# Patient Record
Sex: MALE | Race: WHITE | NOT HISPANIC OR LATINO | Employment: FULL TIME | URBAN - NONMETROPOLITAN AREA
[De-identification: names, ages, dates, MRNs, and addresses within clinical notes are randomized per-mention and may not be internally consistent; named-entity substitution may affect disease eponyms.]

---

## 2021-05-13 ENCOUNTER — TRANSCRIBE ORDERS (OUTPATIENT)
Dept: ADMINISTRATIVE | Facility: HOSPITAL | Age: 56
End: 2021-05-13

## 2021-05-13 DIAGNOSIS — E78.2 MIXED HYPERLIPIDEMIA: Primary | ICD-10-CM

## 2021-05-13 DIAGNOSIS — E66.01 MORBID OBESITY (HCC): ICD-10-CM

## 2021-05-13 DIAGNOSIS — E11.69 TYPE 2 DIABETES MELLITUS WITH OTHER SPECIFIED COMPLICATION, UNSPECIFIED WHETHER LONG TERM INSULIN USE (HCC): ICD-10-CM

## 2021-06-18 ENCOUNTER — HOSPITAL ENCOUNTER (OUTPATIENT)
Dept: GENERAL RADIOLOGY | Facility: HOSPITAL | Age: 56
Discharge: HOME | End: 2021-06-18

## 2021-06-18 ENCOUNTER — HOSPITAL ENCOUNTER (OUTPATIENT)
Dept: CARDIOLOGY | Facility: HOSPITAL | Age: 56
Discharge: HOME | End: 2021-06-18

## 2021-06-18 ENCOUNTER — TRANSCRIBE ORDERS (OUTPATIENT)
Dept: GENERAL RADIOLOGY | Facility: HOSPITAL | Age: 56
End: 2021-06-18

## 2021-06-18 ENCOUNTER — HOSPITAL ENCOUNTER (OUTPATIENT)
Dept: NUTRITION | Facility: HOSPITAL | Age: 56
Discharge: HOME | End: 2021-06-18

## 2021-06-18 VITALS — HEIGHT: 66 IN | WEIGHT: 315 LBS | BODY MASS INDEX: 50.62 KG/M2

## 2021-06-18 DIAGNOSIS — Z72.0 TOBACCO ABUSE: ICD-10-CM

## 2021-06-18 DIAGNOSIS — M25.561 ACUTE BILATERAL KNEE PAIN: ICD-10-CM

## 2021-06-18 DIAGNOSIS — G47.33 OSA (OBSTRUCTIVE SLEEP APNEA): ICD-10-CM

## 2021-06-18 DIAGNOSIS — G62.9 NEUROPATHY: ICD-10-CM

## 2021-06-18 DIAGNOSIS — E66.2: ICD-10-CM

## 2021-06-18 DIAGNOSIS — R06.09 DOE (DYSPNEA ON EXERTION): ICD-10-CM

## 2021-06-18 DIAGNOSIS — E11.9 TYPE 2 DIABETES MELLITUS WITHOUT COMPLICATION, UNSPECIFIED WHETHER LONG TERM INSULIN USE (HCC): ICD-10-CM

## 2021-06-18 DIAGNOSIS — R60.9 EDEMA, UNSPECIFIED TYPE: ICD-10-CM

## 2021-06-18 DIAGNOSIS — J44.9 CHRONIC OBSTRUCTIVE PULMONARY DISEASE, UNSPECIFIED COPD TYPE (HCC): ICD-10-CM

## 2021-06-18 DIAGNOSIS — R06.02 SHORTNESS OF BREATH: ICD-10-CM

## 2021-06-18 DIAGNOSIS — R06.02 SHORTNESS OF BREATH: Primary | ICD-10-CM

## 2021-06-18 DIAGNOSIS — M25.562 ACUTE BILATERAL KNEE PAIN: ICD-10-CM

## 2021-06-18 PROCEDURE — 73562 X-RAY EXAM OF KNEE 3: CPT

## 2021-06-18 PROCEDURE — 71046 X-RAY EXAM CHEST 2 VIEWS: CPT

## 2021-06-18 PROCEDURE — 93005 ELECTROCARDIOGRAM TRACING: CPT | Performed by: NURSE PRACTITIONER

## 2021-06-18 NOTE — PROGRESS NOTES
Adult Outpatient Nutrition  Assessment/PES    Patient Name:  Flaco Patel  YOB: 1965  MRN: [id number]    Assessment Date:  6/18/2021    Comments:  RD spoke with pt for initial nutrition appt focusing on DM 2, hyperlipidemia, and obesity. His current weight is ~330 lbs. Due to contact limitation, unable to obtain signature. Pt given copies of forms, has acknowledged and agreed.     Pt notes that he is a  and has access to a refrigerator in his truck. He often eats sandwiches and soups throughout the day and may drink ~9 Pepsis daily. His girlfriend cooks most of his evening and weekend meals. He does enjoy a variety of foods including non-starchy vegetables and lean proteins, chicken being his favorite. Benefits if decreased soda intake and more consistent carbohydrate intake was reviewed. Carbohydrate sources and portion sizes were discussed and pt asked a variety of questions about different foods and carbohydrate content. Signs/symptoms and treatments for hyperglycemia and hypoglycemia were reviewed.     Pt does have a glucometer but has not started using it to test his blood sugar yet. Healthy blood sugar levels at fasting and 2 hours after a meal were reviewed. He plans to have his girlfriend help him set up the glucometer and will log his readings to share with RD and his primary care. He is currently not involved in any physical activity, RD to send low impact, seated exercises.    3 goals were set:  1. Start eating something small in the morning to help shift eating schedule to avoid eating in the middle of the night.  2. Eat consistent carbohydrate diet, focusing on <60 gm carbohydrate at a meal and 15 - 30 gm at a snack.  3. Cut back on regular soda substituting diet and/or water.    Pt notes that all of these goals are feasible. He denies any nutrition-related questions/concerns at this time. Pt has RD contact information and is encouraged to reach out with questions/concerns  "prior to a follow-up appt scheduled for August 6th @ 10:30 AM. RD available PRN.    General Info     Row Name 06/18/21 1359       Today's Session    Person(s) attending today's session  Patient     Services Used Today?  No       General Information    How Well Do You Speak English?  very well    Do You Speak a Language Other Than English at Home?  no    Preferred Language  English    Are you able to read and write English?  Yes        Physical Findings     Row Name 06/18/21 1359          Physical Findings    Overall Physical Appearance  obese         Anthropometrics     Row Name 06/18/21 1359          Anthropometrics    Height  167.6 cm (66\")     Weight  (!) 150 kg (330 lb)        Ideal Body Weight (IBW)    Ideal Body Weight (IBW) (kg)  65.3     % Ideal Body Weight  229.22        Body Mass Index (BMI)    BMI (kg/m2)  53.37         Nutritional Info/Activity     Row Name 06/18/21 1400       Nutritional Information    Have you had weight changes?  Yes    Describe weight changes  Gained ~20 lbs recently    Have you tried to lose weight before?  No    Do you have difficulty chewing food?  No    Functional Status  able to prepare meals;able to purchase food;ambulatory    List any food cravings/trigger foods you have  Pepsi    What is the biggest challenge you have with your diet?  Knowledge    What type of support do you currently use to help you with your health issues?  Girlfriend    Enter everything you can remember eating in the last 24 hours (1 day)  3-4 AM: [place] with cajun turkey, swiss cheese, bonilla, ham, tomato on a roll Noon: \"\". 7-8 PM: Soup       Physical Activity    Are you currently involved in an activity/exercise program?   No          Estimated/Assessed Needs     Row Name 06/18/21 1359          Calculation Measurements    Weight Used For Calculations  150 kg (330 lb)     Height  167.6 cm (66\")        Estimated/Assessed Needs    Additional Documentation  Calorie Requirements (Group);Protein " Requirements (Group);[place]-St. Jeor Equation (Group);Fluid Requirements (Group)        Calorie Requirements    Estimated Calorie Need Method  [place]-St Jeor     Estimated Calorie Requirement Comment  2210 - 1156        [place]-St. Jeor Equation    RMR ([place]-St. Jeor Equation)  2274.62        Protein Requirements    Weight Used For Protein Calculations  150 kg (330 lb)     Est Protein Requirement Amount (gms/kg)  1.0 gm protein 120 - 149 gm     Estimated Protein Requirements (gms/day)  149.69        Fluid Requirements    Fluid Requirements (mL/day)  2275     Estimated Fluid Requirement Method  other (see comments) 1 mL/kcal     RDA Method (mL)  2275                 Problem/Interventions:  Problem 1     Row Name 06/18/21 1402          Nutrition Diagnoses Problem 1    Problem 1  Impaired Nutrient Utilization     Etiology (related to)  Medical Diagnosis     Endocrine  DM2     Hematological  Other (comment) Hyperlipidemia     Signs/Symptoms (evidenced by)  Biochemical     Specific Labs Noted  Glucose;HgbA1C;Triglyceride         Problem 2     Row Name 06/18/21 1403          Nutrition Diagnoses Problem 2    Problem 2  Overweight/Obesity     Etiology (related to)  Factors Affecting Nutrition     Best Intake of  Other Soda     Signs/Symptoms (evidenced by)  BMI     BMI  Greater than 40               Intervention Goal     Row Name 06/18/21 1403          Intervention Goal    General  Meet nutritional needs for age/condition;Improved nutrition related lab(s)     PO  Meet estimated needs     Weight  Appropriate weight loss           Nutrition Prescription     Row Name 06/18/21 1404          Nutrition Prescription PO    PO Prescription  Begin/change diet     Begin/Change Diet to  Regular     Common Modifiers  Cardiac;Consistent Carbohydrate     New PO Prescription Ordered?  No, recommended         Education/Evaluation     Row Name 06/18/21 1404          Education    Education  Provided education regarding     Provided  education regarding  Diet rationale;Healthy eating for diabetes;Key food habit change;Medical diagnosis;Nutrition for age;Preventative health           Electronically signed by:  Renea Davenport RD  06/18/21 14:05 EDT

## 2021-06-20 LAB
QT INTERVAL: 378 MS
QTC INTERVAL: 413 MS

## 2021-08-27 ENCOUNTER — HOSPITAL ENCOUNTER (OUTPATIENT)
Dept: NUTRITION | Facility: HOSPITAL | Age: 56
Discharge: HOME | End: 2021-08-27

## 2021-08-27 NOTE — PROGRESS NOTES
Nutrition Services    Patient Name:  Flaco Patel  YOB: 1965  MRN: [id number]  Admit Date:  8/27/2021    RD spoke with pt for initial nutrition appt focusing on DM 2, hyperlipidemia, and obesity. He estimates his current weight to be ~325 lbs, a ~5 lb weight loss since his last appt. Due to contact limitation, unable to obtain signature. Pt given copies of forms, has acknowledged and agreed.   His previously set goals were reviewed:  1. Start eating something small in the morning to help shift eating schedule to avoid eating in the middle of the night. He has met this goal 50% of the time during the week. He has been eating oatmeal this past week but missed breakfast 2 days.   2. Eat consistent carbohydrate diet, focusing on <60 gm carbohydrate at a meal and 15 - 30 gm at a snack. He has been sticking to this meal plan structure. He has been limiting his intake of carbohydrates but still eats fruit like 1 banana daily or tangerines or prunes or a fruit cup and some grains like rice or breads. He has focused a lot on portion sizes. For example he may eat two hot dogs in one bun or eat a chicken sandwich without the bun. He has also cut back on potato intake, corn intake, and rice intake.  3. Cut back on regular soda substituting diet and/or water. He is only drinking 1 regular Pepsi per day, down from a previous ~9 daily. The rest of the day he drinks 4-5 Pepsi Max (no sugar) and 3-4 bottled garland.    He has been checking his blood sugar upon waking up and it is usually ~100. During the day it is around 110 with the highest reading he's had at 127 ~4-5 PM. He has been eating more salads and asked questions about specific foods and food preparation methods. He notes that he is really trying to be conscious about what he eats. KENDY was very supportive of his progress. He is interested in checking his weight at his next doctor's appointment and would like to follow-up after his upcoming visit.     1  goal was set:  1. Add protein to his evening snack of a fruit cup. (examples provided)    Pt notes that all of these goals are feasible. He denies any nutrition-related questions/concerns at this time. Pt has RD contact information and is encouraged to reach out with questions/concerns prior to a follow-up appt scheduled for October 4th @ 2:30 PM. RD available PRN.      Electronically signed by:  Renea Davenport RD  08/27/21 11:27 EDT

## 2021-10-04 ENCOUNTER — APPOINTMENT (OUTPATIENT)
Dept: NUTRITION | Facility: HOSPITAL | Age: 56
End: 2021-10-04

## 2021-11-01 ENCOUNTER — APPOINTMENT (OUTPATIENT)
Dept: NUTRITION | Facility: HOSPITAL | Age: 56
End: 2021-11-01

## 2021-11-17 ENCOUNTER — APPOINTMENT (OUTPATIENT)
Dept: NUTRITION | Facility: HOSPITAL | Age: 56
End: 2021-11-17

## 2021-12-10 ENCOUNTER — HOSPITAL ENCOUNTER (OUTPATIENT)
Dept: NUTRITION | Facility: HOSPITAL | Age: 56
Discharge: HOME | End: 2021-12-10

## 2021-12-10 NOTE — PROGRESS NOTES
"Adult Outpatient Nutrition  Assessment/PES    Patient Name:  Flaco Patel  YOB: 1965  MRN: [id number]    Assessment Date:  12/10/2021    Comments:      RD spoke with pt for follow up nutrition appt focusing on DM 2, hyperlipidemia, and obesity. Pt reports he has not weighed himself recently but that all his clothes are fitting looser. Due to contact limitation, unable to obtain signature. Pt given copies of forms, has acknowledged and agreed. He states that his blood glucose has averaged around  and is around 126 in the evenings. He states that he has only been eating around one meal per day d/t being busy with work. RD encouraged pt to eat every few hours, combining carbohydrate with protein to help maintain blood glucose levels. Pt reports he is on Rybelsus and has to monitor his blood glucose because it \"can go low\". RD reviewed symptoms of hypoglycemia and explained that if BG < 70 to consume 15 g CHO (provided examples) and recheck in 15 minutes. Pt reports that he has completely stopped drinking regular soda (was drinking one daily, down from 9/day) and will have an occasional diet Pepsi. He states he has been drinking a lot of water throughout the day. RD was very supportive of his progress.    His previously set goal was reviewed:    1. Add protein to his evening snack of a fruit cup. He has met this goal. Pt reports his often will now eat fresh fruit such as a banana or sliced apples. RD reviewed examples of protein to add to fresh fruit for a snack.         2 goals were set:    1. Continue to monitor blood glucose levels.   2. Continue to combine protein and carbohydrate for meals and snacks.     Pt notes that all of these goals are feasible. He denies any nutrition-related questions/concerns at this time. Pt has RD contact information and is encouraged to reach out with questions/concerns prior to a follow-up appt scheduled for January 14th @ 2:30 PM. RD available " PRN.                        Problem/Interventions:                      Electronically signed by:  Michelle Lugo RD  12/10/21 09:07 EST

## 2022-01-18 ENCOUNTER — HOSPITAL ENCOUNTER (OUTPATIENT)
Dept: NUTRITION | Facility: HOSPITAL | Age: 57
Discharge: HOME | End: 2022-01-18

## 2022-01-18 NOTE — PROGRESS NOTES
Adult Outpatient Nutrition  Assessment    Patient Name:  Flaco Patel  YOB: 1965  MRN: [id number]    Assessment Date:  1/18/2022    Comments:      RD spoke w/ pt via Telehealth for nutrition counseling regarding obesity, DM2 and hyperlipidemia. Pt reports                       Electronically signed by:  Regi Victor RD  01/18/22 16:47 EST

## 2022-03-03 ENCOUNTER — HOSPITAL ENCOUNTER (OUTPATIENT)
Dept: NUTRITION | Facility: HOSPITAL | Age: 57
Discharge: HOME | End: 2022-03-03

## 2022-03-03 NOTE — PROGRESS NOTES
Adult Outpatient Nutrition  Assessment/PES    Patient Name:  Flaco Patel  YOB: 1965  MRN: [id number]    Assessment Date:  3/3/2022    Comments:      RD spoke with pt over the phone for nutrition follow-up appt focusing on nutrition counseling regarding obesity, DM2 and hyperlipidemia. Pt given copies of forms, has acknowledged and agreed. Pt states that he has been losing weight and estimates approx 20 lb weight loss. He reports his clothes are fitting looser than they previously were and he has been feeling better. He has eliminated regular soda from his diet and will sometimes have a diet soda. Pt reports sometimes going 8-9 hrs without eating d/t busy schedule and not always being hungry. He states he will have a nighttime snack consisting of fruit. He states that his BG around noon has been 96-98. RD reviewed protocols for hypo-and hyperglycemia. RD also encouraged pt to add protein to all meals and snacks including his nighttime snack to stabilize BG. RD provided examples of protein foods for snacks. Pt has been drinking 1/2-1 Ensure for snack occassionally. RD encouraged pt to drink 1/2 if he is having it as a snack and to switch to Glucerna or Boost Glucose Control to help stabilize BG.     His previously set goal was reviewed:    1. Eat breakfast 3 x weekly. Pt has met this goal. Pt reports not always being hungry in the morning but is trying to have something for breakfast. He reports eating one piece of whole wheat toast with melissa, ham and egg.     Pt asked a variety of questions which were addressed. RD to mail pt snack and meal ideas, CHO counting, portion sizes, and MyPlate method.     2 goals were set:    1. Have protein, such as a handful of nuts, with nighttime snack.   2. Have a snack midday with protein and CHO-RD provided examples.         Pt notes that all of these goals are feasible. She denies any nutrition-related questions/concerns at this time. Pt has RD contact  information and is encouraged to reach out with questions/concerns prior to a follow-up appt scheduled for May 4 @ 2:30.  RD available PRN                      Problem/Interventions:                      Electronically signed by:  Michelle Lugo RD  03/03/22 15:41 EST

## 2022-05-04 ENCOUNTER — HOSPITAL ENCOUNTER (OUTPATIENT)
Dept: NUTRITION | Facility: HOSPITAL | Age: 57
Discharge: HOME | End: 2022-05-04
Admitting: NURSE PRACTITIONER

## 2022-05-04 VITALS — BODY MASS INDEX: 50.62 KG/M2 | HEIGHT: 66 IN | WEIGHT: 315 LBS

## 2022-05-04 PROCEDURE — 97803 MED NUTRITION INDIV SUBSEQ: CPT

## 2022-05-04 NOTE — PROGRESS NOTES
Adult Outpatient Nutrition  Assessment/PES    Patient Name:  Flaco Patel  YOB: 1965  MRN: [id number]    Assessment Date:  5/4/2022    Comments:    RD spoke to pt over the phone for Telehealth appointment regarding weight loss. Pt reports he has now lost a total of 30#. But hasn't weighed himself in two weeks but believes he is now down to 320#. Pt reports he overall feels much better and has gone down clothing sizes from a 4x to a 2x. Pt and his wife are both trying/losing weight and holding each other accountable. Pt reports he's eating more veggies, nuts, and fruit. Pt is planning meals and has decreased consumption of sweets, pasta, and bread. Pt states he is decreasing his portion sizes and measuring out his portions at meal times. RD congratulated pt on his progress and encouraged him to continue on his path of eating healthier. Pt reports eating fruits and veggies at snack time. RD recommended that the pt increase his protein intake at snack times. Pt reports he goes to the doctor on May 19th, and is excited to share his progress with his provider. RD and pt to discuss his doctors visit at upcoming follow-up appointment.      Two goals were set:  1. Increase protein intake at snack times.   2. Continue weight loss journey and to live a healthy life.     Pt notes all goals are feasible. He denies any nutrition-related questions/concerns at this time. Pt has RD contact information and is encouraged to reach out with questions/concerns prior to follow-up appointment scheduled for Monday, June 13th @ 10:30AM. RD available PRN.      General Info     Row Name 05/04/22 1539       Today's Session    Person(s) attending today's session Patient     Services Used Today? No       General Information    How Well Do You Speak English? very well    Preferred Language English    Are you able to read and write English? Yes    People in Home child(gerri), dependent;spouse               Physical  "Findings     Row Name 05/04/22 1539          Physical Findings    Overall Physical Appearance session via telehealth                Anthropometrics     Row Name 05/04/22 1540 05/04/22 1539       Anthropometrics    Height 167.6 cm (66\") 167.6 cm (66\")    Weight 145 kg (320 lb) 145 kg (320 lb)    Age for Calculations 56 --    Height for Calculation 1.676 m (5' 6\") --    Weight for Calculation 145 kg (320 lb) --               Retired Nutritional Info/Activity     Row Name 05/04/22 1540          Eating Environment    Eating environment Alone                Home Nutrition Report     Row Name 05/04/22 1540          Home Nutrition Report    Diet No specific                Estimated/Assessed Needs - Anthropometrics     Row Name 05/04/22 1540 05/04/22 1539       Anthropometrics    Height 167.6 cm (66\") 167.6 cm (66\")    Weight 145 kg (320 lb) 145 kg (320 lb)    Age for Calculations 56 --    Height for Calculation 1.676 m (5' 6\") --    Weight for Calculation 145 kg (320 lb) --       Estimated/Assessed Needs    Additional Documentation Fluid Requirements (Group);KCAL/KG (Group);Protein Requirements (Group);Estimated Calorie Needs (Group) --       Estimated Calorie Needs    Estimated Calorie Requirement (kcal/day) 1595-1795kcals/day --       KCAL/KG    KCAL/KG 14 Kcal/Kg (kcal);Kcal/KG (kcal) comment  11kcal/kg --    14 Kcal/Kg (kcal) 2032.114 --    KCAL/KG (kcal) 1595 --       Protein Requirements    Weight Used For Protein Calculations 145 kg (320 lb) --    Est Protein Requirement Amount (gms/kg) 0.8 gm protein --    Estimated Protein Requirements (gms/day) 116.12 --       Fluid Requirements    Fluid Requirements (mL/day) 1795  1mL/kcal --    RDA Method (mL) 1795 --                      Problem/Interventions:   Problem 1     Row Name 05/04/22 1544          Nutrition Diagnoses Problem 1    Problem 1 Overweight/Obesity     Etiology (related to) Other (comment)  diet and physical activity imbalance     Signs/Symptoms (evidenced " by) BMI     BMI Greater than 40                        Intervention Goal     Row Name 05/04/22 1544          Intervention Goal    General Maintain nutrition;Meet nutritional needs for age/condition     PO Meet estimated needs     Weight Appropriate weight loss                  Nutrition Prescription     Row Name 05/04/22 1545          Nutrition Prescription PO    Common Modifiers Consistent Carbohydrate     New PO Prescription Ordered? Yes                Education/Evaluation     Row Name 05/04/22 1545          Monitor/Evaluation    Monitor Per protocol;PO intake;Pertinent labs;Weight;Food/activity diary                 Electronically signed by:  SHANIQUE JOHNS RD  05/04/22 15:46 EDT

## 2022-06-13 ENCOUNTER — HOSPITAL ENCOUNTER (OUTPATIENT)
Dept: NUTRITION | Facility: HOSPITAL | Age: 57
Discharge: HOME | End: 2022-06-13
Admitting: NURSE PRACTITIONER

## 2022-06-13 PROCEDURE — 97802 MEDICAL NUTRITION INDIV IN: CPT

## 2022-06-13 NOTE — PROGRESS NOTES
Adult Outpatient Nutrition  Assessment    Patient Name:  Flaco Patel  YOB: 1965  MRN: [id number]    Assessment Date:  6/13/2022    Comments:    Spoke w/ pt over the phone for Telehealth follow-up appointment regarding weight loss. Pt reports he has lost 51# and his current wt is 310#. Pt states he recently had a check-up visit w/ his PCP. He states his PCP was very proud of his progress and that his BP has decreased and he is now on a lower dose blood pressure medicine. Pt reports he is drinking more and more water throughout the day. He only drinks 2 zero sugar sodas/day for the caffeine. Pt states his snacks throughout the day consist of chicken salad w/ veggies, cherries, almonds, and ritz crackers w/ cheese. Pt states that at mealtimes he fills his plate w/ non-starchy vegetables and w/ a protein. He also reports he now has more energy throughout the day and is more physically active. Pt also reports his blood sugars continue to decrease. Pt believes he has been able to make a sustainable lifestyle change, and plans to continue his weight loss journey. RD congratulated pt on his progress.       At this time pt does not want to schedule a follow-up appointment. Pt expressed his gratitude for our help. Pt w/ RD contact information and is encouraged to reach out w/ any nutrition-related questions/concerns. RD available PRN.                         Electronically signed by:  SHANIQUE JOHNS RD  06/13/22 11:05 EDT

## 2023-03-03 ENCOUNTER — LAB (OUTPATIENT)
Dept: LAB | Facility: HOSPITAL | Age: 58
End: 2023-03-03
Payer: COMMERCIAL

## 2023-03-03 ENCOUNTER — TRANSCRIBE ORDERS (OUTPATIENT)
Dept: LAB | Facility: HOSPITAL | Age: 58
End: 2023-03-03
Payer: COMMERCIAL

## 2023-03-03 DIAGNOSIS — I10 HTN (HYPERTENSION) WITH GOAL TO BE DETERMINED: ICD-10-CM

## 2023-03-03 DIAGNOSIS — J44.9 CHRONIC OBSTRUCTIVE PULMONARY DISEASE, UNSPECIFIED COPD TYPE: ICD-10-CM

## 2023-03-03 DIAGNOSIS — K21.9 GERD WITH APNEA: ICD-10-CM

## 2023-03-03 DIAGNOSIS — E78.5 HYPERLIPIDEMIA, UNSPECIFIED HYPERLIPIDEMIA TYPE: ICD-10-CM

## 2023-03-03 DIAGNOSIS — R06.81 GERD WITH APNEA: ICD-10-CM

## 2023-03-03 DIAGNOSIS — E55.9 VITAMIN D DEFICIENCY: ICD-10-CM

## 2023-03-03 DIAGNOSIS — E78.5 HYPERLIPIDEMIA, UNSPECIFIED HYPERLIPIDEMIA TYPE: Primary | ICD-10-CM

## 2023-03-03 DIAGNOSIS — E11.9 DIABETES MELLITUS WITHOUT COMPLICATION: ICD-10-CM

## 2023-03-03 DIAGNOSIS — N40.0 BPH WITHOUT URINARY OBSTRUCTION: ICD-10-CM

## 2023-03-03 DIAGNOSIS — G47.411 NARCOLEPSY AND CATAPLEXY: ICD-10-CM

## 2023-03-03 DIAGNOSIS — M54.50 LOW BACK PAIN, UNSPECIFIED BACK PAIN LATERALITY, UNSPECIFIED CHRONICITY, UNSPECIFIED WHETHER SCIATICA PRESENT: ICD-10-CM

## 2023-03-03 LAB
25(OH)D3 SERPL-MCNC: 35.4 NG/ML (ref 30–100)
ALBUMIN SERPL-MCNC: 3.9 G/DL (ref 3.5–5.2)
ALBUMIN/GLOB SERPL: 1.1 G/DL
ALP SERPL-CCNC: 89 U/L (ref 39–117)
ALT SERPL W P-5'-P-CCNC: 46 U/L (ref 1–41)
ANION GAP SERPL CALCULATED.3IONS-SCNC: 11 MMOL/L (ref 5–15)
AST SERPL-CCNC: 30 U/L (ref 1–40)
BASOPHILS # BLD AUTO: 0.05 10*3/MM3 (ref 0–0.2)
BASOPHILS NFR BLD AUTO: 0.4 % (ref 0–1.5)
BILIRUB SERPL-MCNC: 0.5 MG/DL (ref 0–1.2)
BUN SERPL-MCNC: 15 MG/DL (ref 6–20)
BUN/CREAT SERPL: 16 (ref 7–25)
CALCIUM SPEC-SCNC: 9.4 MG/DL (ref 8.6–10.5)
CHLORIDE SERPL-SCNC: 99 MMOL/L (ref 98–107)
CHOLEST SERPL-MCNC: 123 MG/DL (ref 0–200)
CO2 SERPL-SCNC: 28 MMOL/L (ref 22–29)
CREAT SERPL-MCNC: 0.94 MG/DL (ref 0.76–1.27)
DEPRECATED RDW RBC AUTO: 41.3 FL (ref 37–54)
EGFRCR SERPLBLD CKD-EPI 2021: 94.6 ML/MIN/1.73
EOSINOPHIL # BLD AUTO: 0.45 10*3/MM3 (ref 0–0.4)
EOSINOPHIL NFR BLD AUTO: 3.8 % (ref 0.3–6.2)
ERYTHROCYTE [DISTWIDTH] IN BLOOD BY AUTOMATED COUNT: 13.2 % (ref 12.3–15.4)
GLOBULIN UR ELPH-MCNC: 3.4 GM/DL
GLUCOSE SERPL-MCNC: 173 MG/DL (ref 65–99)
HBA1C MFR BLD: 6.6 % (ref 4.8–5.6)
HCT VFR BLD AUTO: 50.7 % (ref 37.5–51)
HDLC SERPL-MCNC: 39 MG/DL (ref 40–60)
HGB BLD-MCNC: 17.3 G/DL (ref 13–17.7)
IMM GRANULOCYTES # BLD AUTO: 0.04 10*3/MM3 (ref 0–0.05)
IMM GRANULOCYTES NFR BLD AUTO: 0.3 % (ref 0–0.5)
LDLC SERPL CALC-MCNC: 58 MG/DL (ref 0–100)
LDLC/HDLC SERPL: 1.36 {RATIO}
LYMPHOCYTES # BLD AUTO: 3.59 10*3/MM3 (ref 0.7–3.1)
LYMPHOCYTES NFR BLD AUTO: 30.6 % (ref 19.6–45.3)
MCH RBC QN AUTO: 29.6 PG (ref 26.6–33)
MCHC RBC AUTO-ENTMCNC: 34.1 G/DL (ref 31.5–35.7)
MCV RBC AUTO: 86.7 FL (ref 79–97)
MONOCYTES # BLD AUTO: 0.75 10*3/MM3 (ref 0.1–0.9)
MONOCYTES NFR BLD AUTO: 6.4 % (ref 5–12)
NEUTROPHILS NFR BLD AUTO: 58.5 % (ref 42.7–76)
NEUTROPHILS NFR BLD AUTO: 6.86 10*3/MM3 (ref 1.7–7)
NRBC BLD AUTO-RTO: 0 /100 WBC (ref 0–0.2)
PLATELET # BLD AUTO: 345 10*3/MM3 (ref 140–450)
PMV BLD AUTO: 9.4 FL (ref 6–12)
POTASSIUM SERPL-SCNC: 4.4 MMOL/L (ref 3.5–5.2)
PROT SERPL-MCNC: 7.3 G/DL (ref 6–8.5)
RBC # BLD AUTO: 5.85 10*6/MM3 (ref 4.14–5.8)
SODIUM SERPL-SCNC: 138 MMOL/L (ref 136–145)
TRIGL SERPL-MCNC: 154 MG/DL (ref 0–150)
TSH SERPL DL<=0.05 MIU/L-ACNC: 1.19 UIU/ML (ref 0.27–4.2)
VLDLC SERPL-MCNC: 26 MG/DL (ref 5–40)
WBC NRBC COR # BLD: 11.74 10*3/MM3 (ref 3.4–10.8)

## 2023-03-03 PROCEDURE — 83036 HEMOGLOBIN GLYCOSYLATED A1C: CPT

## 2023-03-03 PROCEDURE — 80050 GENERAL HEALTH PANEL: CPT

## 2023-03-03 PROCEDURE — 82306 VITAMIN D 25 HYDROXY: CPT

## 2023-03-03 PROCEDURE — 80061 LIPID PANEL: CPT

## 2023-03-03 PROCEDURE — 36415 COLL VENOUS BLD VENIPUNCTURE: CPT

## 2023-08-25 ENCOUNTER — LAB (OUTPATIENT)
Dept: LAB | Facility: HOSPITAL | Age: 58
End: 2023-08-25
Payer: COMMERCIAL

## 2023-08-25 ENCOUNTER — TRANSCRIBE ORDERS (OUTPATIENT)
Dept: LAB | Facility: HOSPITAL | Age: 58
End: 2023-08-25
Payer: COMMERCIAL

## 2023-08-25 DIAGNOSIS — J44.9 CHRONIC OBSTRUCTIVE PULMONARY DISEASE, UNSPECIFIED COPD TYPE: ICD-10-CM

## 2023-08-25 DIAGNOSIS — I10 HTN (HYPERTENSION), MALIGNANT: Primary | ICD-10-CM

## 2023-08-25 DIAGNOSIS — I10 HTN (HYPERTENSION), MALIGNANT: ICD-10-CM

## 2023-08-25 LAB
ALBUMIN SERPL-MCNC: 3.9 G/DL (ref 3.5–5.2)
ALBUMIN/GLOB SERPL: 1.4 G/DL
ALP SERPL-CCNC: 94 U/L (ref 39–117)
ALT SERPL W P-5'-P-CCNC: 22 U/L (ref 1–41)
ANION GAP SERPL CALCULATED.3IONS-SCNC: 11.1 MMOL/L (ref 5–15)
AST SERPL-CCNC: 13 U/L (ref 1–40)
BILIRUB SERPL-MCNC: 0.5 MG/DL (ref 0–1.2)
BUN SERPL-MCNC: 14 MG/DL (ref 6–20)
BUN/CREAT SERPL: 12.7 (ref 7–25)
CALCIUM SPEC-SCNC: 9.3 MG/DL (ref 8.6–10.5)
CHLORIDE SERPL-SCNC: 101 MMOL/L (ref 98–107)
CO2 SERPL-SCNC: 25.9 MMOL/L (ref 22–29)
CREAT SERPL-MCNC: 1.1 MG/DL (ref 0.76–1.27)
DEPRECATED RDW RBC AUTO: 41.8 FL (ref 37–54)
EGFRCR SERPLBLD CKD-EPI 2021: 78.3 ML/MIN/1.73
ERYTHROCYTE [DISTWIDTH] IN BLOOD BY AUTOMATED COUNT: 13.7 % (ref 12.3–15.4)
GLOBULIN UR ELPH-MCNC: 2.8 GM/DL
GLUCOSE SERPL-MCNC: 126 MG/DL (ref 65–99)
HBA1C MFR BLD: 6.7 % (ref 4.8–5.6)
HCT VFR BLD AUTO: 46.7 % (ref 37.5–51)
HGB BLD-MCNC: 15.8 G/DL (ref 13–17.7)
MCH RBC QN AUTO: 29.4 PG (ref 26.6–33)
MCHC RBC AUTO-ENTMCNC: 33.8 G/DL (ref 31.5–35.7)
MCV RBC AUTO: 86.8 FL (ref 79–97)
PLATELET # BLD AUTO: 320 10*3/MM3 (ref 140–450)
PMV BLD AUTO: 9.1 FL (ref 6–12)
POTASSIUM SERPL-SCNC: 3.9 MMOL/L (ref 3.5–5.2)
PROT SERPL-MCNC: 6.7 G/DL (ref 6–8.5)
RBC # BLD AUTO: 5.38 10*6/MM3 (ref 4.14–5.8)
SODIUM SERPL-SCNC: 138 MMOL/L (ref 136–145)
WBC NRBC COR # BLD: 12.3 10*3/MM3 (ref 3.4–10.8)

## 2023-08-25 PROCEDURE — 85027 COMPLETE CBC AUTOMATED: CPT

## 2023-08-25 PROCEDURE — 83036 HEMOGLOBIN GLYCOSYLATED A1C: CPT

## 2023-08-25 PROCEDURE — 36415 COLL VENOUS BLD VENIPUNCTURE: CPT

## 2023-08-25 PROCEDURE — 80053 COMPREHEN METABOLIC PANEL: CPT
